# Patient Record
Sex: MALE | Race: WHITE | NOT HISPANIC OR LATINO | URBAN - METROPOLITAN AREA
[De-identification: names, ages, dates, MRNs, and addresses within clinical notes are randomized per-mention and may not be internally consistent; named-entity substitution may affect disease eponyms.]

---

## 2017-03-23 ENCOUNTER — IMPORTED ENCOUNTER (OUTPATIENT)
Dept: URBAN - METROPOLITAN AREA CLINIC 50 | Facility: CLINIC | Age: 67
End: 2017-03-23

## 2017-04-03 ENCOUNTER — IMPORTED ENCOUNTER (OUTPATIENT)
Dept: URBAN - METROPOLITAN AREA CLINIC 50 | Facility: CLINIC | Age: 67
End: 2017-04-03

## 2018-01-10 ENCOUNTER — IMPORTED ENCOUNTER (OUTPATIENT)
Dept: URBAN - METROPOLITAN AREA CLINIC 50 | Facility: CLINIC | Age: 68
End: 2018-01-10

## 2018-02-16 ENCOUNTER — IMPORTED ENCOUNTER (OUTPATIENT)
Dept: URBAN - METROPOLITAN AREA CLINIC 50 | Facility: CLINIC | Age: 68
End: 2018-02-16

## 2018-02-23 ENCOUNTER — IMPORTED ENCOUNTER (OUTPATIENT)
Dept: URBAN - METROPOLITAN AREA CLINIC 50 | Facility: CLINIC | Age: 68
End: 2018-02-23

## 2018-03-01 ENCOUNTER — IMPORTED ENCOUNTER (OUTPATIENT)
Dept: URBAN - METROPOLITAN AREA CLINIC 50 | Facility: CLINIC | Age: 68
End: 2018-03-01

## 2018-03-01 NOTE — PATIENT DISCUSSION
"""S/P IOL OS: EnVista MX60 33.0 (Target: [place]) +Femto/Arcs +TM. Continue post operative instructions and drops per schedule.  """

## 2018-03-09 ENCOUNTER — IMPORTED ENCOUNTER (OUTPATIENT)
Dept: URBAN - METROPOLITAN AREA CLINIC 50 | Facility: CLINIC | Age: 68
End: 2018-03-09

## 2018-03-09 NOTE — PATIENT DISCUSSION
"""Phaco with IOL OD: 03/15/2018 EnVista MX60 34.0 Target: [facility]

## 2018-03-09 NOTE — PATIENT DISCUSSION
"""S/P IOL OS: EnVista MX60 33.0 (Target: [place]) +Femto/Arcs +TM. Continue post operative instructions and drops per schedule.  """

## 2018-03-15 ENCOUNTER — IMPORTED ENCOUNTER (OUTPATIENT)
Dept: URBAN - METROPOLITAN AREA CLINIC 50 | Facility: CLINIC | Age: 68
End: 2018-03-15

## 2018-03-15 NOTE — PATIENT DISCUSSION
"""S/P IOL OD: EnVista MX60 34.0 (Target: [place]) +Femto/Arcs +TM. Continue post operative instructions and drops per schedule.  """

## 2018-03-30 ENCOUNTER — IMPORTED ENCOUNTER (OUTPATIENT)
Dept: URBAN - METROPOLITAN AREA CLINIC 50 | Facility: CLINIC | Age: 68
End: 2018-03-30

## 2018-03-30 NOTE — PATIENT DISCUSSION
"""S/P IOL OD: EnVista MX60 34.0 (Target: [place]) +Femto/Arcs +TM. Continue post operative instructions and drops per schedule.  """

## 2018-04-27 ENCOUNTER — IMPORTED ENCOUNTER (OUTPATIENT)
Dept: URBAN - METROPOLITAN AREA CLINIC 50 | Facility: CLINIC | Age: 68
End: 2018-04-27

## 2018-04-27 NOTE — PATIENT DISCUSSION
"""S/P IOL OU: OD: EnVista MX60 34.0 (Target: [place])Femto/Arcs +TM. OS: EnVista MX60 33.0 (Target: [place])/Arcs +TM. "

## 2018-10-24 ENCOUNTER — IMPORTED ENCOUNTER (OUTPATIENT)
Dept: URBAN - METROPOLITAN AREA CLINIC 50 | Facility: CLINIC | Age: 68
End: 2018-10-24

## 2018-10-24 NOTE — PATIENT DISCUSSION
"""Informed patient that their capsular opacification is not visually significant or does not meet the ""

## 2019-05-28 ENCOUNTER — IMPORTED ENCOUNTER (OUTPATIENT)
Dept: URBAN - METROPOLITAN AREA CLINIC 50 | Facility: CLINIC | Age: 69
End: 2019-05-28

## 2020-02-02 ENCOUNTER — IMPORTED ENCOUNTER (OUTPATIENT)
Dept: URBAN - METROPOLITAN AREA CLINIC 50 | Facility: CLINIC | Age: 70
End: 2020-02-02

## 2020-05-26 ENCOUNTER — IMPORTED ENCOUNTER (OUTPATIENT)
Dept: URBAN - METROPOLITAN AREA CLINIC 50 | Facility: CLINIC | Age: 70
End: 2020-05-26

## 2020-07-15 ENCOUNTER — IMPORTED ENCOUNTER (OUTPATIENT)
Dept: URBAN - METROPOLITAN AREA CLINIC 50 | Facility: CLINIC | Age: 70
End: 2020-07-15

## 2020-07-15 PROBLEM — Z96.1: Noted: 2018-03-15

## 2020-07-15 PROBLEM — Z96.1: Noted: 2018-03-01

## 2020-07-15 PROBLEM — Z98.890: Noted: 2020-07-15

## 2020-07-22 PROBLEM — Z98.890: Noted: 2020-07-15

## 2020-07-22 PROBLEM — Z98.890: Noted: 2020-07-22

## 2020-07-22 PROBLEM — Z96.1: Noted: 2018-03-15

## 2020-07-22 PROBLEM — Z96.1: Noted: 2018-03-01

## 2020-09-01 ENCOUNTER — IMPORTED ENCOUNTER (OUTPATIENT)
Dept: URBAN - METROPOLITAN AREA CLINIC 50 | Facility: CLINIC | Age: 70
End: 2020-09-01

## 2021-04-16 ASSESSMENT — VISUAL ACUITY
OD_CC: J1+
OS_SC: 20/40
OD_BAT: 20/40
OS_CC: 20/100
OD_OTHER: 20/40. 20/70.
OS_CC: 20/20
OS_CC: J1+@ 16 IN
OS_OTHER: 20/50. 20/70.
OD_OTHER: 20/40. 20/60.
OS_CC: 20/25-
OS_CC: J1@ 16 IN
OD_CC: 20/20-1
OD_OTHER: 20/40. 20/60.
OS_BAT: 20/40
OD_OTHER: 20/50. 20/70.
OS_OTHER: 20/30. 20/50.
OS_BAT: 20/40
OS_CC: 20/25-
OS_CC: J1+
OD_CC: 20/25-2
OD_BAT: 20/40
OD_BAT: 20/50-1+2
OS_BAT: 20/30
OS_CC: 20/25
OD_CC: J1+@ 18 IN
OD_CC: 20/20-
OD_CC: 20/25+2
OS_CC: 20/20-1
OD_CC: 20/25
OS_OTHER: 20/40. 20/40.
OS_OTHER: 20/40. 20/70.
OD_BAT: 20/50
OS_OTHER: 20/50. 20/80.
OS_CC: J1+@ 18 IN
OS_SC: 20/70+
OD_BAT: 20/40
OS_CC: 20/20
OD_CC: 20/30+-
OD_OTHER: 20/50. 20/80.
OD_CC: 20/25
OD_CC: 20/25
OS_BAT: 20/50
OD_BAT: 20/40
OD_SC: 20/30-2
OS_CC: J1
OD_CC: J1@ 16 IN
OD_CC: 20/20-1
OS_BAT: 20/50
OS_CC: 20/25
OS_BAT: 20/40
OD_SC: 20/30-2
OD_SC: 20/50+
OD_CC: J1
OD_CC: J1+@ 16 IN
OS_OTHER: 20/40. 20/60.
OD_BAT: 20/50
OD_OTHER: 20/40. 20/60.
OS_CC: 20/25+2
OS_SC: 20/40-
OS_CC: 20/20-1

## 2021-04-16 ASSESSMENT — TONOMETRY
OS_IOP_MMHG: 17
OD_IOP_MMHG: 15
OS_IOP_MMHG: 17
OS_IOP_MMHG: 14
OD_IOP_MMHG: 16
OD_IOP_MMHG: 15
OD_IOP_MMHG: 15
OS_IOP_MMHG: 17
OS_IOP_MMHG: 16
OS_IOP_MMHG: 15
OS_IOP_MMHG: 14
OD_IOP_MMHG: 18
OS_IOP_MMHG: 18
OD_IOP_MMHG: 14
OS_IOP_MMHG: 15
OS_IOP_MMHG: 14
OD_IOP_MMHG: 16
OD_IOP_MMHG: 14
OD_IOP_MMHG: 13
OD_IOP_MMHG: 14
OS_IOP_MMHG: 18
OS_IOP_MMHG: 17
OS_IOP_MMHG: 15
OD_IOP_MMHG: 17
OD_IOP_MMHG: 16
OD_IOP_MMHG: 14

## 2021-08-24 ENCOUNTER — PREPPED CHART (OUTPATIENT)
Dept: URBAN - METROPOLITAN AREA CLINIC 53 | Facility: CLINIC | Age: 71
End: 2021-08-24

## 2021-09-01 ENCOUNTER — COMPREHENSIVE EXAM (OUTPATIENT)
Dept: URBAN - METROPOLITAN AREA CLINIC 53 | Facility: CLINIC | Age: 71
End: 2021-09-01

## 2021-09-01 DIAGNOSIS — H35.373: ICD-10-CM

## 2021-09-01 DIAGNOSIS — H02.831: ICD-10-CM

## 2021-09-01 DIAGNOSIS — H52.4: ICD-10-CM

## 2021-09-01 DIAGNOSIS — H47.321: ICD-10-CM

## 2021-09-01 DIAGNOSIS — H02.834: ICD-10-CM

## 2021-09-01 DIAGNOSIS — H35.361: ICD-10-CM

## 2021-09-01 DIAGNOSIS — H52.03: ICD-10-CM

## 2021-09-01 PROCEDURE — 92134 CPTRZ OPH DX IMG PST SGM RTA: CPT

## 2021-09-01 PROCEDURE — 92014 COMPRE OPH EXAM EST PT 1/>: CPT

## 2021-09-01 PROCEDURE — 92015 DETERMINE REFRACTIVE STATE: CPT

## 2021-09-01 ASSESSMENT — VISUAL ACUITY
OD_CC: 20/30
OU_CC: J1+
OU_CC: 20/20
OS_CC: 20/20
OS_CC: J1+
OD_PH: 20/25
OD_CC: J1

## 2021-09-01 ASSESSMENT — TONOMETRY
OD_IOP_MMHG: 15
OS_IOP_MMHG: 15

## 2022-09-14 ENCOUNTER — COMPREHENSIVE EXAM (OUTPATIENT)
Dept: URBAN - METROPOLITAN AREA CLINIC 53 | Facility: CLINIC | Age: 72
End: 2022-09-14

## 2022-09-14 DIAGNOSIS — H35.373: ICD-10-CM

## 2022-09-14 DIAGNOSIS — H35.361: ICD-10-CM

## 2022-09-14 DIAGNOSIS — H47.321: ICD-10-CM

## 2022-09-14 PROCEDURE — 92014 COMPRE OPH EXAM EST PT 1/>: CPT

## 2022-09-14 PROCEDURE — 92134 CPTRZ OPH DX IMG PST SGM RTA: CPT

## 2022-09-14 ASSESSMENT — TONOMETRY
OS_IOP_MMHG: 16
OD_IOP_MMHG: 16

## 2022-09-14 ASSESSMENT — VISUAL ACUITY
OU_CC: 20/20
OS_CC: 20/20
OD_CC: 20/25
OD_SC: 20/40-2
OD_PH: 20/30
OS_SC: 20/30-1

## 2023-09-19 ENCOUNTER — COMPREHENSIVE EXAM (OUTPATIENT)
Dept: URBAN - METROPOLITAN AREA CLINIC 49 | Facility: LOCATION | Age: 73
End: 2023-09-19

## 2023-09-19 DIAGNOSIS — H35.373: ICD-10-CM

## 2023-09-19 DIAGNOSIS — H35.361: ICD-10-CM

## 2023-09-19 DIAGNOSIS — H47.321: ICD-10-CM

## 2023-09-19 DIAGNOSIS — H02.834: ICD-10-CM

## 2023-09-19 DIAGNOSIS — H52.4: ICD-10-CM

## 2023-09-19 DIAGNOSIS — H02.831: ICD-10-CM

## 2023-09-19 PROCEDURE — 92014 COMPRE OPH EXAM EST PT 1/>: CPT

## 2023-09-19 PROCEDURE — 92134 CPTRZ OPH DX IMG PST SGM RTA: CPT

## 2023-09-19 ASSESSMENT — VISUAL ACUITY
OU_CC: 20/20-1
OS_CC: 20/20-1
OD_CC: 20/25-2
OU_CC: J1+@14"

## 2023-09-19 ASSESSMENT — TONOMETRY
OS_IOP_MMHG: 16
OD_IOP_MMHG: 16

## 2024-06-22 ENCOUNTER — APPOINTMENT (RX ONLY)
Dept: URBAN - METROPOLITAN AREA CLINIC 319 | Facility: CLINIC | Age: 74
Setting detail: DERMATOLOGY
End: 2024-06-22